# Patient Record
(demographics unavailable — no encounter records)

---

## 2024-12-19 NOTE — PHYSICAL EXAM
[General Appearance - Well Developed] : well developed [Normal Appearance] : normal appearance [Well Groomed] : well groomed [General Appearance - Well Nourished] : well nourished [No Deformities] : no deformities [General Appearance - In No Acute Distress] : no acute distress [Normal Conjunctiva] : the conjunctiva exhibited no abnormalities [Eyelids - No Xanthelasma] : the eyelids demonstrated no xanthelasmas [Normal Oral Mucosa] : normal oral mucosa [No Oral Pallor] : no oral pallor [No Oral Cyanosis] : no oral cyanosis [Normal Jugular Venous A Waves Present] : normal jugular venous A waves present [Normal Jugular Venous V Waves Present] : normal jugular venous V waves present [No Jugular Venous Oro A Waves] : no jugular venous oro A waves [Respiration, Rhythm And Depth] : normal respiratory rhythm and effort [Exaggerated Use Of Accessory Muscles For Inspiration] : no accessory muscle use [Auscultation Breath Sounds / Voice Sounds] : lungs were clear to auscultation bilaterally [Heart Rate And Rhythm] : heart rate and rhythm were normal [Heart Sounds] : normal S1 and S2 [Murmurs] : no murmurs present [Abdomen Soft] : soft [Abdomen Tenderness] : non-tender [Abdomen Mass (___ Cm)] : no abdominal mass palpated [Abnormal Walk] : normal gait [Gait - Sufficient For Exercise Testing] : the gait was sufficient for exercise testing [Nail Clubbing] : no clubbing of the fingernails [Cyanosis, Localized] : no localized cyanosis [Petechial Hemorrhages (___cm)] : no petechial hemorrhages [] : no ischemic changes [Skin Color & Pigmentation] : normal skin color and pigmentation [No Venous Stasis] : no venous stasis [No Skin Ulcers] : no skin ulcer [No Xanthoma] : no  xanthoma was observed [Oriented To Time, Place, And Person] : oriented to person, place, and time [Affect] : the affect was normal [Mood] : the mood was normal [No Anxiety] : not feeling anxious [FreeTextEntry1] : Mildly flat affect but less so today

## 2024-12-19 NOTE — REVIEW OF SYSTEMS
[Myalgia] : myalgia [Dizziness] : dizziness [Negative] : Heme/Lymph [Fever] : no fever [Headache] : no headache [Weight Gain (___ Lbs)] : no recent weight gain [Chills] : no chills [Weight Loss (___ Lbs)] : no recent weight loss [Dyspnea on exertion] : not dyspnea during exertion [Chest Discomfort] : no chest discomfort [Lower Ext Edema] : no extremity edema [Leg Claudication] : no intermittent leg claudication [Syncope] : no syncope [Abdominal Pain] : no abdominal pain [Nausea] : no nausea [Vomiting] : no vomiting [Heartburn] : no heartburn [Change in Appetite] : no change in appetite [Change In The Stool] : no change in stool [Diarrhea] : diarrhea [Constipation] : no constipation [Depression] : no depression [Anxiety] : no anxiety [Suicidal] : not suicidal [FreeTextEntry5] : No more syncope or near syncope. [FreeTextEntry9] : See HPI [de-identified] : See HPI [de-identified] : Less depressed less anxious

## 2024-12-19 NOTE — ASSESSMENT
[FreeTextEntry1] : Since his cardiac catheterization with stents to his LAD and obtuse marginal (8/2013), he has done very well. Since then the majority of his symptoms are totally gone, and he is left with his usual baseline mild dyspnea on exertion and atypical symptoms. Even these now seem to be less. He was bothered by BECK again. He had a good aerobic capacity on his stress test in 2015 and no evidence of ischemia. LV function was normal on his echo in July of 2016. No significant valvular disease. He had a repeat stress echocardiogram, which seemed to have EKG changes, although the echo was totally normal. After discussion we agreed to recheck his coronary arteries (4/30/2018) and angiography showed nonobstructive disease. He has done well since then. Had symptoms of muscle pain in his leg, similar to when he was on Lipitor and Crestor, resolved when he stopped the simvastatin, but he is now on pravastatin since August, 2018 without complaints. (He had a TIA on June 11, 2016 which was temporally related to aspirin being stopped by his urologist for a procedure and the patient not restarting the aspirin. He was placed on Plavix along with the resumption of aspirin and has had no further symptoms. Neurology wanted a repeat echo, which was normal, and no clots were seen. He was placed on telemetry and had both a 7 beat run of nonsustained VT at a heart rate of 147 and a 40 beat run of atrial flutter with aberrancy and a heart rate of 100-132. Both of these were asymptomatic. He was placed on bisoprolol and Xarelto. The rest of the home telemetry did not show any further V. tach or atrial flutter. He was doing fine other than watery diarrhea, as well as some fatigue that was probably a beta blocker side effects. I believe years ago he had trouble with beta blockers as well. I stopped the bisoprolol and changed to atenolol and both the fatigue and the diarrhea resolved. He developed anterior thigh pain on the atorvastatin, and I changed him to simvastatin, with improvement, but then I recently raised the dose) He had another TIA almost identical on July 19, 2021. Resolved very quickly although the following morning on the 20th in the hospital to me his speech seemed a little off still. Work-up totally negative as mentioned above. No large vessel disease anywhere. No atrial fibrillation. He is on Xarelto and Plavix and compliant with both. Labs were unremarkable. The only abnormality was a "significant territory with delayed mean transit time suggesting territory at risk for infarction". He is on maximum protection already with statin Plavix Xarelto etc. He was scheduled for an echo as requested by neurology which was unremarkable and unchanged, and I urged him to follow-up with neurology and see if they have any suggestions as how to prevent this in the future. He did follow-up with neurology, had a negative EEG, and the neurologist is happy with how he is doing and does not want to see him again until August unless new symptoms arise. On exam he is totally unchanged with a normal exam, normal blood pressure, benign abdomen with nodular consistency unchanged, and a baseline EKG that has not changed with sinus rhythm, small Q's inferiorly, which are old, first degree AV block, T wave inversion V1 (not V2 today), and the rest is within normal limits. No APCs or VPCs on exam just some VPCs during stress and recovery 3/2022. His blood pressure is excellent. His weight is slightly less. He is now on Invokana as well as Victoza for his diabetes. Here in follow-up without any new cardiac complaints or issues. Neurologically seems stable although still concerning that we do not have definite etiologies. Having muscle pains from pravastatin so we will try 1 more statin and if not we will try PCSK9 inhibitors. Labs sent including CK. Hemoglobin A1c will be checked as well. He already had a flu shot as well as his COVID boosters. Will be going down to [place] for a while. If stable will return in 4 months. Patient here January 26, 2023.. Very confusing picture but does not sound neurologic; no findings of TIA or CVA. Perhaps muscle inflammation, pain, weakness, perhaps related to the combination of statin and fibrate although has always had normal CK levels. In addition the statin was stopped a few weeks ago. Associated weight loss also hard to explain and tied together. Currently patient looks back to baseline other than the weight loss, no leg pains, cardiac exam and EKG unchanged. Labs were sent which will include sed rate, CRP, CK, aldolase, TSH etc. We will try to change to PCSK9 inhibitors; perhaps can stop the fenofibrate as well them. Unclear as yet if he should have neurologic follow-up as well. Reviewed medications; is on Eliquis not Xarelto now. Off statin but still on Zetia and fenofibrate. Off Jardiance but on Farxiga and Ozempic Returned March 14, 2023 on Repatha but still gets some anterior thigh pain. Still on fenofibrate and Zetia. From a cardiac point of view totally stable and if anything his weight is back up 4 pounds. He will be seeing neurology next week. EKG is totally unchanged in sinus rhythm. Returned again June 13, 2023. Has been very stable other than the one fall on the holiday coming from [place] and the episode of shortness of breath while putting a sheet on the bed with his wife this morning. Exam and ECG today are unchanged. Labs will be sent. Now on Abilify as well. Tolerating Repatha without a problem. Pretty good spirits. Weight up a few more pounds which may be a good sign. Urgent visit July 19 after continuing dizzy and near syncopal episodes which now have seem to resolve off atenolol totally. Still remains in sinus rhythm and still has a first-degree AV block. No other symptoms at this time. I reviewed his long medication list. In addition to being off atenolol he is on Vascepa which was given to him by Dr. Cohen (I am not in favor of Vascepa usually). No other medications that have any significant effect on blood pressure or heart rate. Exam is unchanged and unremarkable. Another urgent visit September 11, 2023. Having trouble with his legs, trouble with his walking and his balance with a few falls and seemingly weakness. To them it seems like when he was in [place] and after that we had stopped his statin. His endocrinologist had recently put him on Vascepa and I am concerned that that may be including the fenofibrate could be the issue. Now that he is on Repatha injections he may not need even the fenofibrate for his triglycerides so for now I told him to stop both the Cipro and fenofibrate while I check labs. He had no evidence of myasthenia gravis on exam. I would like him to see neurology again especially if there is no change off of those 2 medications. He does seem perfectly stable from a blood pressure and cardiac point of view with no syncope or near syncope since we stopped his atenolol. Patient returned October 10, 2023 and seems as close to normal as he has been in a while. 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 Cardiovascular status is stable, blood pressure good, EKG unchanged and exam unremarkable. We will continue to current medications and if he remains stable he can follow-up for routine visit plus labs in 3 months. Patient returned again December 20, 2023. He had another episode of neurologic symptoms with unclear etiology. The only difference this time seems to be a 50 to 60% stenosis in his common carotid arteries but surgical consult at the time felt this is still a nonsurgical case. He will be following up with vascular neurology. I reviewed the rest of his hospital studies and his medications. He seems to be back to himself at least 90%. Labs in the hospital were unremarkable. His LDL cannot go much lower, his blood pressure is excellent his diabetes control is excellent, he is on Eliquis and Plavix so it is not clear what else there is to offer and whether or not there are any other etiologies. As he is seeing Dr. Soriano in January and a different neurologist in February I would follow-up with him in March unless there are any more events. Patient returned February 12, 2024. Very happy with Dr. Soriano who felt also that there was no indication for revascularization or any intervention and just wants to follow him clinically now. Dr. Soriano wants to continue the Plavix and the Eliquis and may be repeat a carotid Doppler in a year and only see him again in 1 year. We reviewed the patient's medications including the fark [employer]. He is still on fenofibrate along with Ozempic, Repatha, ezetimibe. Metformin. He seems to be at baseline from a cardiac point of view as well with good blood pressure control, no real cardiac symptoms, no change in his ECG with sinus bradycardia at 59 and a first-degree AV block. Labs were sent. Patient returned June 17, 2024. New issue was a hospitalization for about 2-1/2 days at the end of April during [facility] for transient global amnesia. Workup as mentioned above. No change in his medications and he is clearly back to himself. Exam vital signs ECG all baseline. Reviewed all of his records send long discussion with patient. Patient returned for an urgent visit, September 13, 2024 because of another TIA episode. This time the drooping face was on the right instead of the left and the patient was initially not aware until he tried to speak. It had been 5 months since his last episode. It only lasted about 5 minutes and there was a question of whether he would seem to come back before he went into the ambulance but by the time he got to the ambulance he was back to normal and was only in the hospital briefly. We reviewed all his medications. He saw Dr. Hermilo Soriano of neurology who reviewed everything and as mentioned he is on correct and maximum everything without much to add other than increasing his bleeding risk. Perhaps he occasionally misses some medication but he is not sure. Meanwhile we will check his labs and make sure that everything is where it should be and just forge ahead.Not sure what Dr. Soriano meant about the Watchman device or how that would change anything. So far there has been no evidence that atrial arrhythmias have been playing a role with these episodes. Agree there is no indication for carotid intervention at this time.   Patient returns in follow-up today December 19, 2024.  Thankfully no more TIA episodes since above, very stable from a cardiovascular point of view, excellent blood pressure normal EKG etc.  Unfortunately having a lot of pain in his left thigh which Dr. Phillip thinks is from his spine but has not been able to help him so far including spinal injections and Neurontin.  I suggested maybe he actually see an orthopedist.  Otherwise I am happy with how he is doing and we tried to emphasize the positive.  Labs were sent.

## 2024-12-19 NOTE — DISCUSSION/SUMMARY
[EKG obtained to assist in diagnosis and management of assessed problem(s)] : EKG obtained to assist in diagnosis and management of assessed problem(s) [FreeTextEntry1] : Labs sent including lipids, hemoglobin A1c, proBNP.  If all okay will follow-up in 4 months.  No change in medicines from my point of view.

## 2024-12-19 NOTE — CARDIOLOGY SUMMARY
[No Ischemia] : no Ischemia [LVEF ___%] : LVEF [unfilled]% [Normal] : normal LA size [None] : no mitral regurgitation [___] : [unfilled] [de-identified] : Done at [facility] July 20, 2021.  No change from previous CT September 5, 2018.  No core infarct but there is 101 mm territory with delayed mean transit time (which supposedly suggest territory at risk for infarction).  Carotid bifurcations are normal.  Carotid and vertebrals are normal.  No aneurysm stenosis or vessel occlusion.  Sinuses are normal.  MRI was normal as well.

## 2024-12-19 NOTE — HISTORY OF PRESENT ILLNESS
[FreeTextEntry1] : In 2000 the patient developed classic exertional angina walking up hills or walking fast or walking in the cold. The pressure in his chest that radiated to his throat and the associated with shortness of breath. It was always relieved with rest. Stress tests were totally normal but the symptoms persisted. He finally underwent cardiac catheterization in September, 200 which just showed a 40-50% mid LAD lesion and otherwise normal coronary arteries with normal LV function. The thought was that he either had Syndrome X with angina despite normal coronary arteries or that this was related to esophageal reflux or asthma. Patient was diagnosed with asthma and has been treated by Dr. Edgar Meza of allergy. He did end up having an endoscopy that was totally normal In 2001. In addition he also has a metabolic syndrome X with low HDL, high triglycerides, and borderline glucose. He has done well over the years on medical therapy including Crestor, TriCor, Cardizem CD, metformin, and inhalers. He is also on omeprazole for presumed esophageal reflux despite normal endoscopy. He had erectile dysfunction from beta blockers. He developed hypotension at one point with some orthostasis and his Cardizem CD had to be stopped.  11/2012 His symptoms returned, mostly being chest tightness with exertion relieved with rest again. He saw Dr. Edgar Meza who increased his inhalers by adding Flonase and then the patient came here. He denies any change in his weight, bowel movements, or any other health situation. He had a stress echocardiogram in November 20 where he exercised to 9 METS, . He had his usual chest pain or shortness of breath. There were EKG changes but they returned to baseline within 2 minutes of recovery. On echocardiogram it was a little difficult to see the inferoseptum, but this seemed unchanged from the rest images. The rest of the echo was unremarkable. It was felt that this was his usual chest pain, shortness of breath that was nonischemic and he has actually done well since November. However just a couple weeks ago he started having the same symptoms again with just walking up stairs in his house or with any exercise. His asthma has been under good control although his reflux has been acting up a little bit lately. Otherwise he has been fine and is only into him medical issues was recently having a cystoscopy under anesthesia. He underwent stress echocardiogram here which was somewhat abnormal and different than his previous stress echoes. His ST depressions lasted longer than before, and there was definitely a wall motion abnormality anteriorly though it could not be certain if it was reversible.  He was scheduled for a CTA which showed significant coronary artery disease. 8/12/2013 he underwent cardiac catheterization with Dr. Eliud Arrington. He was found to have 90% proximal LAD, 80% mid LAD that were both stented and an 80% obtuse marginal that was stented with drug-eluting stents. The right coronary artery just has some plaquing no more than 30% lesion. He had normal LV function. This was done on the second and he went home on August 3 has done well since.   August 15, 2013, Here in followup and his recent symptoms are mostly gone but he still gets occasional mild shortness of breath with exertion; this symptom he had in the past even when his stress tests were normal and may not be cardiac related. His only new medications are lisinopril 2.5 as he was off his ACE inhibitor for a while and Plavix for stent. October 17, 2013 he continues to do well, going on the treadmill for half hour every day at varying inclines. He has the same symptoms mentioned on the last visit which are mostly his asthma-related symptoms. He feels a tiny bit of chest pain when he first starts walking on the treadmill but after that it's gone and he attributes that to his reflux. Normal activities off the treadmill are fine. He is being very good about his diet alone his wife is getting a little crazy trying to maintain want he can eat between the carbohydrates and fat restriction. February 20, 2014. Here for followup. He is doing extremely well, exercising on the treadmill without symptoms, not having any orthostatic symptoms. He had a shingles vaccine yesterday at the pharmacy. His labs were excellent and he was continued on the same medication. Vy 10, 2014. The patient called yesterday and mentioned that now when he goes on the treadmill again he is getting that same chest pain he had before. He wasn't sure that this was significant, but when he tried to go on again the next day he could only do 7 minutes and the chest pain recurred. He is here today for followup along with a stress echocardiogram to see if those symptoms are ischemic related. His allergies have been acting up somewhat and has taken an occasional Allegra and Allegra-D. He has not had any symptoms walking level ground or climbing stairs, only the 2 episodes while on the treadmill. Here he was able to exercise for 3 full stages to 9 minutes without any symptoms, significant EKG changes, or echocardiographic evidence of ischemia. He has put on some weight and he did have coffee prior to exercising at home.  His stress echo was normal and he was reassured. October 13, 2014. Patient is here for followup. He is more than one year since his coronary stent and is now off dual antiplatelet meds. He continues to gain some weight over the holidays but otherwise has no complaints. February 26, 2015. The patient is here for followup. No chest pain, dyspnea on exertion, or any exertional symptoms but he does feel that he is falling asleep in the middle of the day and is much more tired than he thinks he should be. He now has a treadmill at work and even use that on a regular basis. No interim hospitalizations. We had a long discussion about DAPT after the recent studies came out but so far he is only on aspirin. No interval hospitalizations or procedures. April 28, 2015. Patient here for stress test and echocardiogram along with a brief visit for a preop clearance. His echocardiogram showed normal LV function with no significant valvular disease. His stress test he was able to exercise 8 METs and a heart rate of 132 with a normal blood pressure response with shortness of breath but no chest pain and with no evidence of ischemia. His exam was unchanged and he should not be considered to be at increased risk for the upcoming urologic procedure.  He had no complications from the neurologic procedure. December 2015 patient elected to stop the Plavix and just continue baby aspirin. In March he was getting muscle pains from Crestor, and we changed to Lipitor, and he did well. In April. He was having some leg pains and stop the Lipitor but there was no change so he went back on the Lipitor. June 6, 2016. Patient is here for preop clearance for a cystoscopy and internal urethrotomy. No chest pain or shortness of breath, even going up 3 flights of stairs. No change in his medications. He had preop labs done at [facility]. CBC, unchanged. Chemistries acceptable, with a BUN of 13 and a creatinine of 0.92. Glucose was 180 with a hemoglobin A1c of 7. Urinalysis was unremarkable other than glucose. EKG was unchanged from prior tracings. July 20, 2016. Here for urgent followup. On June 11 patient had a TIA. Despite my notification to the urologist on the preop recommendations and my instructions to the patient, he was told to stop his aspirin 10 days before the procedure and he did not resume the aspirin after the procedure. In the emergency room everything resolved quickly although there was one question of a new symptom on the left arm whereas the original symptoms have been on the right. Based on those findings. Plavix was added to aspirin, but Dr. Eamon Zimmerman wanted home monitoring looking for atrial arrhythmias. The patient has been wearing the monitor and has been totally asymptomatic. On July 11th, at around midnight, the patient had a 7 beat run of ventricular tachycardia at a rate of 147 beats per minute. No other the PVCs or ventricular tachycardia since then. However, on July 17 at 6:15 in the morning the patient had a 40 beat run of atrial flutter with aberrancy at a heart rate between 100 and 132. Again, this was asymptomatic. He is here for followup and to discuss. There have been no change in his medications and no interval symptoms. Based on these findings, I elected to place him on a low-dose beta blocker, which hopefully he will tolerate this time, and Xarelto 20 mg for anticoagulation. Because the TIA was so recent and because it was temporally related to him stopping aspirin therapy, I would continue him on one antiplatelet agent for a short time as well. Given that the patient has significant acid reflux and heartburn, we will continue Plavix and stop the aspirin for now. Extensive discussion was had with the patient and his wife. He will continue the home telemetry through July 30. August 11, 2016. The patient had no further arrhythmias on home telemetry. He has had no recurrence of TIA symptoms. However, he is having fatigue, and other beta blocker side effects, and is having watery diarrhea whenever he goes to the bathroom. The two new medications are bisoprolol and Xarelto. Otherwise, he feels fine, but is somewhat discouraged by these side effects. Changed bisoprolol to atenolol with resolution of his symptoms. Then, was getting anterior thigh pain, and I changed his atorvastatin to simvastatin. December 19, 2016. Patient is here in followup. No muscle pains on simvastatin. Very happy with current regimen in terms of side effects and tolerability. No recurrence of TIA, syncope, palpitations, exertional chest pain, etc. Had flu shot already. TG was 1100 so reinstituted Tricor. Hemoglobin A1c was 7.4. March 2, 2017. Patient here in followup. No palpitations, dizziness, chest pain, shortness of breath, etc. Is back on Tricor without symptoms. Will be seeing Dr. Evaristo Cohen of endocrinology next week. Saw Dr. Storm of  on January 10 was pleased with his progress. BUN and creatinine came back elevated; possibly from the recent urologic procedures. He saw Dr. Evaristo Cohen of endocrine, and on repeat testing his creatinine was 1.24. He was also placed on Invokanna. April 6, 2070. Now here for followup with repeat lab testing et cetera. Feeling well without complaints. Tolerating the Invokanna although it is expensive. In August 2017. Patient here in followup. Doing well, and weight continues to stay down. No change in symptoms. He did call in June and then was having muscle aches again and went back to simvastatin with improvement. No other change in medications and no interval hospitalizations.  Based on the labs I increased his simvastatin to 80 mg. December 7, 2017. Patient in followup. He had some symptoms a few weeks ago, but clearly turned out to be related to his asthma and he has had not had a problem since. He feels well without complaints. He is tolerating the 80 milligram dose of simvastatin. He is also on fenofibrate because of his high triglycerides. Last stress test was 2015 with an echo last year. April 12, 2018. Here for f/u. Mentioned BECK but no chest pain. Also rare RLQ pain at times. April 27, 2018. Had patient returned for stress echocardiogram due to symptoms. Exercised to 6 minutes, but heart rate only 103. Did have shortness of breath and then admitted a little bit of chest pressure. EKG in recovery did have ST depressions although not severe in II, III, aVF, V3 through 6, which he did not have on prior stress test since his stent. Echo did not show any wall motion abnormalities. After discussion with patient elected to schedule him for coronary angiography May 1, 2018. LVEF 70%. Normal left main. 30% proximal circumflex. 50% first obtuse marginal. 40% proximal RCA. 60% distal RCA with negative FFR. Moderate atherosclerosis distal LAD. Stents patent  August 16, 2018. The patient is here in followup. No chest pain or shortness of breath. Again, has pain in his right thigh and knee without tenderness, similar to what he had before on other statins. After much discussion, we agreed to stop the simvastatin, and if the symptoms resolve rechallenge with it. If necessary to change we will first try Pravachol, and then Livalo. December 20, 2018. Patient here in followup. Feels great. No complaints. Blood pressure excellent. Weight stable. (Today is his [place] birthday--70.) On pravastatin, and seems to be tolerating it. April 16, 2019. Patient in followup doing well with no complaints or symptoms. No change in medication. Only negative is he has gained about 3 more pounds. August 14, 2019. Patient here in followup. Had labs with Dr. Cohen recently the results of which are pending. Remains with sinus rhythm and a first degree AV block. No symptoms. No complaints. Went on a trip [place] in July with the whole family and had no problems. Weight is actually down 3-1/2 pounds. December 16, 2019.  Labs from Dr. Danielle. Cholesterol 112, HDL 28, LDL 32, BUN 29 with creatinine 1.33 and potassium 4.9. Normal thyroids. Normal CBC. Hemoglobin A1c 7.2. Eosinophils 7.3%.  December 23, 2019.  Patient here in follow-up.  Weight down 1 pound.  No chest pain or shortness of breath.  EKG unchanged.  Only new complaint is his left arm is a little weaker than it used to be and he has a little difficulty with his elbow etc.  That was the arm affected a little bit by his TIA as well.  I advised to go back to the neurologist but he wants to see Nii Oneill of orthopedics first.  He is thinking of joining a gym with a friend of his now that he is living in [place]. April 23, 2020.  Patient here in follow-up.  EKG sinus bradycardia at 52 with tiny Q's in 3 and aVF, T wave inversions V1 V2, and otherwise within normal limits.  No change in his baseline shortness of breath or chest pain regarding his asthma.  No palpitations or dizziness to suspect any paroxysmal arrhythmias.  He did have a period of time few weeks ago with severe muscle cramps and aches but no fever no respiratory symptoms and he went yesterday for a fingerstick antibody test in an urgent care that was negative for coronavirus. August 28, 2020.  Patient here in follow-up.  Enjoying life in [place] near his son.  No chest pain shortness of breath or any medical issues.  Saw Dr. Cohen of endocrinology last week and asked me to forward the labs to him.  Discussion about triglycerides as usual.  EKG is sinus rhythm at 58 with small Q waves in III and aVF. December 11, 2020.  Patient here in follow-up.  Going to [place] for a month this coming Sunday.  Saw Dr. Atkins on the 10th and labs showed a normal CBC, BUN 27 with creatinine 1.4 and potassium 4.5.  Normal LFTs.  Cholesterol 124 fasting triglycerides 361 HDL 29 LDL 23.  Uric acid 5.1.  Hemoglobin A1c 7.2.  Patient denies any cardiac issues or complaints.  EKG here unchanged. March 2, 2021.Patient has been having some dizziness mostly orthostatic when he gets up from sitting too long.  His internist said it could be orthostatic hypotension.  Patient called to ask about stopping his lisinopril.  He is only on 2.5 mg daily.  I told him he could certainly do that for now and then reevaluate.  April 7, 2021.  Patient here in follow-up.  EKG shows sinus rhythm at 68 with a first-degree AV block, tiny Q's inferiorly, mild ST changes V2; unchanged from last December. No dizziness since d/c lisinopril. Continues to loss weight gradually. Fully vaccinated against Covid 19. Much more relaxed since retired. July 20, 2021.  Yesterday EMS called at the patient's house because he had some weakness and slurred speech that was already mostly resolving.  I instructed them to take him to [facility].  Their code stroke was called but there was no indication for TPA.  Virtually all his symptoms had resolved.  The CT angio showed no large vessel disease and no evidence of infarction.  There was 1 area of significant territory with "delayed mean transit time suggesting territory at risk for infarction" but no idea what to do about that.  MRI was done later that night that was negative as well.  Labs were unremarkable.  Sinus rhythm throughout.  He was discharged on the 20th to follow-up with me and with neurology.  Echo was recommended as part of the protocol but he has had normal echoes in the past, not likely to have an LV thrombus, MERON thrombus would not be seen without PIERRE and the patient has been on Xarelto for out so very unlikely. July 22, 2021.  Patient returns here in follow-up.  Feels 100% back to himself although a little hesitant and anxious.  He remains in sinus rhythm with no change.  Blood pressure is excellent.  He will be scheduled for an echo and a follow-up with neurology.  He will remain on all his medications which include Plavix and Xarelto as well as statin. August 2, 2021.  The neurologist from [facility] was out of town so her friend recommended Dr. James of neurology who saw the patient and called me today.  For lack of anything else to do he suggested trying to switch from Xarelto to Eliquis 5 every 12 hours which I have no objection.  Discussed with the patient as well and will start the Eliquis tonight instead of Xarelto.  He has an appointment for an echocardiogram here August 13 and so far has not had any further episodes.  August 13, 2021.Came for echocardiogram.  Minimal MR.  Normal aortic valve.  Normal left atrium.  Normal LV function with LVEF 67%.  Normal RV function.  RVSP 30.  No color-flow Doppler evidence of ASD or PFO.  Spoke with patient who sounds back to normal.  Awaiting the reports from his neuro work-up.  August 20, 2021.  Received a note from the neurologist Dr. Crocker ([phone]) basically he is not convinced that these are TIAs and somehow thinks they could be related to cervical radiculopathy.  He is convinced that everything is being done to the maximum in terms of risk factors between the Eliquis the level of his LDL and his diabetes control etc.  He is considering reviewing the transcranial and intracranial Dopplers and considering an EEG to rule out seizures which I think is very unlikely.  He has the patient following up with orthopedics for cervical radiculopathy and he gave him a prescription for physical therapy and Occupational Therapy and advised him to take co-Q10 for the memory changes and headaches.  He will follow up with the patient in 4 to 6 weeks.  October 7, 2021.Patient called.  Had EEG in the neurologist office but is still waiting for results.  Nothing in the computer.  He will call them and asked them to send results to me as well  October 27, 2021.  Patient here in follow-up.  The EEG was negative.  So far he has had no recurrence. Feels well without complaints although does seem to have a slightly depressed affect at times. February 11, 2022.Patient called because he had a nuclear brain scan by neurology.  The scan is totally normal and it says "does not support the diagnosis of Parkinson's disease or Parkinson syndrome"  March 8, 2022.  Patient here for follow-up along with stress echocardiogram.  Definite difficulty walking and could really not go faster than stage one of the Vu protocol.  At that point his heart rate was 92 and his blood pressure was about 140/70 and he had no chest pain, no ST changes, and no echo evidence of ischemia.  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  Seems a little bradykinetic and parkinsonian affect but no complaints.  Neurology has been satisfied with how he is doing. July 12, 2022.  Patient here in follow-up.  Absolutely no more episodes since above.  Stable weight, no new symptoms, no change in medication.  No COVID episodes etc.  (Going to [employer] now with his wife) November 15, 2022.  Patient returns in follow-up.  Saw neurology who is very happy with how he is doing.  He seems to have only 1 small defect in terms of writing certain types of letters but otherwise stable and they do not want to change anything in his regimen.  (They were supposed to send me a letter but I have not received it yet).  He has no new issues with chest pain or shortness of breath and no COVID issues.  His psychiatrist Dr. Colon is retiring and he wants me to continue prescribing his Lexapro 10 mg daily which he will do for now.  Getting muscle pains from the pravastatin and he finally stopped them.  Has already been on atorvastatin and simvastatin so we will try 20 mg of rosuvastatin and check a CK.  I did reassure him that he does not so clear that it is a statins that cause it and he is willing to take PCSK9 inhibitors by injection if necessary as he is already doing well on Ozempic injections. January 23, 2023. Patient called from [place].  Certainly has urinary incontinence and weakness in his wife that he cannot walk.  Certainly have to be concerned about some type of cord compression among other things.  I urged the patient to go to an urgent care center and they can call me from there.  January 26, 2023.  Patient back now here with his wife to review the situation.  It seems around Sunday the 22nd patient's legs were so weak that he could not really walk.  No other signs or symptoms to suggest stroke or TIA.  No discomfort or pain.  Slight improvement on Monday so they went to urgent care.  While he did not urgent care was told him he should go to the emergency room and they decided instead to come back home.  He has continued to gradually improve and get some strength back.  In retrospect perhaps it is tied into the fact that he was having severe pain In his statin and the rosuvastatin was stopped at least 2 weeks ago may be more.  He still remains on fenofibrate and Zetia.  His last CK was normal.  He has been losing weight because of the change in appetite.  He mostly eats cakes and only occasional regular food.  Denies change in his bowel movements etc. March 14, 2023.  Patient has been totally stable since the episode in [place].  He is now on Repatha, still on fenofibrate and Zetia, still has some muscle pain in the front of his thighs but as mentioned his CK was normal.  In the past his triglycerides have gone as high as 700 so I am a little nervous to stop the fenofibrate and he has no complaints right now.  He will be seeing neurology next week and they have said they will be doing a number of tests as well.  No cardiac symptoms or issues.  His EKG remains sinus rhythm and unchanged.  He put back to about 4 pounds.  Blood pressure is excellent and his spirits are good. June 13, 2023.  Patient returns in follow-up.  Has been fine in the interim; Abilify 5 mg added by Dr. Colon.  Meanwhile today while just putting a sheet on the bed he suddenly felt short of breath and it lasted about 15 minutes. (His wife said it is an activity that he never does).  No chest pain, and then it went away like his asthma.  In addition on Shavuot he fell coming home from [place] and Dr. Sharma his PCP happened to be there and checked him out that day and the next day and all was fine.  They now have Wendy in his house so he does not have to worry about going to and from she will.  Remains in sinus rhythm at 64 with a first-degree AV block and small Q waves inferiorly and tall R in V1. June 28, 2023. Patient called. Was at the neurologist yesterday. Complained of some dizziness and he was told to drink a lot of water and check with me about his blood pressure being low. Patient is on atenolol 12.5 mg only. No longer on lisinopril. I told the patient just to make sure he drinks a lot of water and not to be concerned for now. The neurologist is setting him up for some type of scan coming up relatively soon. He also put him on Aricept 5 mg.  July 19, 2023.  Urgent visit for patient.  He called me last week while I was on vacation in [facility] and was still having near syncopal episodes.  I stopped the atenolol and he has been asymptomatic since.  His heart rate here is 68 and he remains in sinus rhythm with a first-degree AV block and some nonspecific ST changes with small Q's inferiorly.  His blood pressure is 134/72.  His weight is up as he admits to snacking more. September 11, 2023.  Here with his wife for urgent visit.  Progressive problems with weakness in his legs and or balance with not infrequent falls and now hesitating to even go out because of that.  At one point the wife said she thinks it similar to when he was in [place]; after that we had stopped his statin and changed to Repatha injections and he did seem to get better.  Lately Dr. Cohen of endocrinology added Vascepa and he is on fenofibrate for his high triglycerides in the past as high as 700. Reviewed labs with the patient. LDL extremely low. No evidence of myositis muscle enzymes or inflammation. Patient already feeling better off the Vascepa and fenofibrate. Is awaiting a callback from Dr. Florian Sandoval of neurology for an appointment. October 10, 2023.  Patient here in follow-up.  On September 12 he saw neurology, Dr. Crocker.  He did find a mildly unsteady gait but able to tandem, no limitations and the rest of his neuro exam seems normal.  He does have a sensory peripheral polyneuropathy consistent with diabetes as well as bilateral multilevel lumbosacral radiculopathy most pronounced at L4-L5.  He felt the dizziness was probably related to cerebellar stroke or else blood pressure issues and wants the patient to maintain excellent hydration.  Continue Eliquis and Repatha along with his Plavix.  (Plavix for cardiac reasons).  Try Namenda 5 mg as the patient had headaches from Aricept and consider neurocognitive specialist although the patient wanted to hold off.  Here today he is in sinus rhythm at 63 with a first-degree AV block and the rest of the ECG is unremarkable.  Blood pressure acceptable and weight unchanged.  Patient thinks his walking is much better and he thinks it was within a few days of stopping the Vascepa fenofibrate etc..  In good spirits.  (Will start using Dr. Mt Cohen's daughter-in-law for endocrinology now that Dr. Mt Danielle has passed away.) December 20, 2023.  Patient had another set of symptoms similar to the previous ones, again admitted to the hospital, again nothing specific found was then sent to rehab and is now here after finally getting out of rehab.  Remains in sinus rhythm at 65 with a mild first-degree AV block and small inferior Q waves.  Blood pressure acceptable at 132/62.  Weight unchanged.  In the hospital he had an EEG that was unremarkable for seizure activity just some generalized slowing in the background.  This time on CTA there is a 50 to 60% stenosis prior to the bifurcation in the distal right common carotid, 50% stenosis of the bifurcation of the left common and internal carotid.  He does feel much better with physical and Occupational Therapy and his speech seems close to normal again.  His regular neurologist recommended he see Dr. Soriano of vascular neurology In January and his internist Dr. Sharma recommended. a different neurologist for February visit.  December 29, 2023.Patient called because he has been having some orthostatic type symptoms. Its mostly after sitting for a long time and then he gets up and feels a little lightheaded. It is not very frequent and he does not get near syncopal etc. Certainly has not fallen from that. I reviewed his medications and he is on nothing that affects his blood pressure anymore. I explained to him about using things such as midodrine to raise his blood pressure but I would only use that if his symptoms were more consistent or more severe as otherwise he will be left with supine hypertension which in his condition would probably not be a good thing. He was okay with that reassurance and he will keep track of how often and how severe the symptoms are. January 2, 2024. Still having some orthostatic episodes, usually after sitting and watching TV for a long time, lasts maybe even up to 5 minutes. Discussed the pros and cons of medicines to raise his blood pressure which I would like to avoid if not necessary. For now we will try to make sure he maintains hydration and will add salt to his diet. Will make sure to be very aware of the possibility when he first changes positions and to wait before walking etc. If it does continue to happen despite this we will have to bring him in for midodrine or Florinef etc. January 31, 2024 Patient called after seeing Dr. Hermilo Soriano of neurology and I reviewed Dr. Soriano's note. No indication for carotid endarterectomy at this point in time. Continue current medications including Eliquis and Plavix. Will probably repeat carotid Dopplers at the 1 year duane. Patient very happy and currently has been asymptomatic. I did mention that he should not be alarmed or disappointed if the symptoms recur as we really have not found any etiology and thankfully he bounces back every time. Keep regular upcoming cardiac care. February 12, 2024.  Patient here in follow-up.  EKG is sinus bradycardia at 59 with a first-degree AV block 0.228 and otherwise an unremarkable ECG.  Initial blood pressure a little high but upon repeat it did improve.  This is somewhat of a long stretch without any symptoms and he is very happy and optimistic and encouraged that hopefully things will be stable on this current regimen and he is very happy with Dr. Soriano. June 5, 2024. Received labs dated May 30, 2024. Normal CBC. Normal chemistries except BUN 25 with creatinine 1.1. Potassium 4.3. Hemoglobin A1c 5.7 lipase 203 amylase 78. No lipids.  June 17, 2024.  Patient here in follow-up.  After the first Seder night, the next afternoon he awoke from a nap and had no idea who he was, thought his wife was his mother and he was taken to [facility].  Workup there including CT, MRI, labs all unremarkable. and he was discharged the next day.  After about a day and a half he was back to himself.  He was diagnosed with transient global amnesia and told he does not need any follow-up.  CT angio showed no carotid occlusion, no aneurysms, no stenosis or occlusion of anterior middle or posterior cerebral arteries or distal vertebral or basilar arteries.  There was moderate stenosis of the supraclinoid ICAs right more than left.  Less than 50% stenosis at the the origin of each ICA from a calcified bifurcation plaque.  His MRI had no acute change and it did have prominent ventricles disproportionate to the sulci but that was unchanged as well.  For some reason they did a cine esophagram and swallow test and there was trace laryngeal penetration without aspiration.  Here he is totally back to himself with normal vital signs and a normal ECG.  He had labs dated May 30 as mentioned above but no lipids. September13, 2024.  I spoke with Dr. Dane Sharma.  6 days ago on [place] he had another TIA with definitely trouble speaking and definitely trouble moving 1 side.  Dr. Sharma was there and evaluated the patient and within 5 minutes he was back to baseline.  He did end up seeing Dr. Hermilo Soriano of neurology On September 10.  He reviewed previous studies etc. and said he still sees no role for intervening in his carotids and he wanted to continue Plavix and Eliquis.  He raised the possibility of the recurrent TIAs or atrial fibrillation related and should start to consider a Watchman device (?).  He felt he might be a good candidate for the LAAOS for study.  Meanwhile he just wanted another carotid Doppler in a year with a follow-up appointment after that December 19, 2024.  Patient returns in follow-up.  He has not had any more episodes since the 1 back in September. Dr. Phillip "not worried about TIAs". Main problem is left thigh pain which he thinks is from the back but no response so far. Somewhat disheartened but okay overall.

## 2024-12-19 NOTE — REASON FOR VISIT
[FreeTextEntry1] : Followup of patient after instituting beta blocker, and Xarelto for paroxysmal A. fib, and paroxysmal ventricular tachycardia, after TIA off ASA, now another TIA while on Plavix and Xarelto. Here today for follow up visit after yet another admission with neurologic symptoms followed by rehab stay.  Now here after admission for transient global amnesia Now here after another TIA

## 2025-01-07 NOTE — PHYSICAL EXAM
[General Appearance - Alert] : alert [General Appearance - Well Nourished] : well nourished [General Appearance - Well Developed] : well developed [Oriented To Time, Place, And Person] : oriented to person, place, and time [Affect] : the affect was normal [Mood] : the mood was normal [Person] : oriented to person [Place] : oriented to place [Time] : oriented to time [Concentration Intact] : normal concentrating ability [Visual Intact] : visual attention was ~T not ~L decreased [Naming Objects] : no difficulty naming common objects [Repeating Phrases] : no difficulty repeating a phrase [Writing A Sentence] : no difficulty writing a sentence [Fluency] : fluency intact [Comprehension] : comprehension intact [Reading] : reading intact [Past History] : adequate knowledge of personal past history [Cranial Nerves Optic (II)] : visual acuity intact bilaterally,  visual fields full to confrontation, pupils equal round and reactive to light [Cranial Nerves Oculomotor (III)] : extraocular motion intact [Cranial Nerves Trigeminal (V)] : facial sensation intact symmetrically [Cranial Nerves Facial (VII)] : face symmetrical [Cranial Nerves Vestibulocochlear (VIII)] : hearing was intact bilaterally [Cranial Nerves Glossopharyngeal (IX)] : tongue and palate midline [Cranial Nerves Accessory (XI - Cranial And Spinal)] : head turning and shoulder shrug symmetric [Cranial Nerves Hypoglossal (XII)] : there was no tongue deviation with protrusion [Motor Tone] : muscle tone was normal in all four extremities [Motor Strength] : muscle strength was normal in all four extremities [No Muscle Atrophy] : normal bulk in all four extremities [Sensation Tactile Decrease] : light touch was intact [Balance] : balance was intact [Full Visual Field] : full visual field [Hearing Threshold Finger Rub Not Sweet [place]] : hearing was normal [] : no respiratory distress [Heart Rate And Rhythm] : heart rate was normal and rhythm regular [Abnormal Walk] : normal gait

## 2025-01-07 NOTE — HISTORY OF PRESENT ILLNESS
[FreeTextEntry1] : Mr. Kuldeep Laird is a 74 y/o man with PMH of DM, HLD, CAD (status post stents), atrial fibrillation (on Eliquis), BPH, TIAs and strokes including (per outpatient neurologist) right pontine stroke and cerebellar strokes with some residual left-sided weakness. He presented to [facility] ED 11/15/23 with fluctuating left facial and left hemiparesis. CT HEAD was negative, CTA: Right carotid system: moderate calcified plaque at the distal common carotid artery immediately prior to the bifurcation resulting in a moderate to high-grade (50-60 %) stenosis. Left carotid system: Extensive calcified plaque at the distal LEFT common carotid artery and proximal LEFT internal carotid artery resulting in a moderate (50%) stenosis at the bifurcation. MRI BRAIN: No evidence of acute infarction or midline shift. MRI BRAIN: No evidence of acute infarction or midline shift. EEG: Mild generalized cerebral dysfunction, nonspecific. No epileptiform activity or seizures were recorded. He was evaluated by neurosurgery, but deemed not a candidate for carotid revascularization. He was discharged to [facility] on 11/20/23. He is on Plavix and Eliquis. He comes in today for a follow up visit. He was evaluated at [facility] ED on 09/07/2024 for a transient right facial droop and dysarthria, lasting 10-15 min. CT Head was negative and CTA showed unchanged moderate bilateral carotid stenosis. P2Y12 was 98 and he denies missing any doses of Eliquis. On 09/23/24 he presented to [facility] ED with speech difficulties. MRI HEAD was negative, Carotid doppler shows mild - moderate stenosis and he was discharged the next day. He comes in today for a follow up visit. He denies any interval episodes since the September event. He is on Eliquis and Plavix for stroke prevention.

## 2025-01-07 NOTE — REVIEW OF SYSTEMS
[Feeling Tired] : not feeling tired [Suicidal] : not suicidal [Depression] : no depression [Confused or Disoriented] : no confusion [Memory Lapses or Loss] : no memory loss [Decr. Concentrating Ability] : no decrease in concentrating ability [Difficulty with Language] : no ~M difficulty with language [Changed Thought Patterns] : no change in thought patterns [Repeating Questions] : no repeated questioning about recent events [Arm Weakness] : no arm weakness [Hand Weakness] : no hand weakness [Leg Weakness] : no leg weakness [Poor Coordination] : good coordination [Difficulty Writing] : no difficulty writing [Difficulties in Speech] : no speech difficulties [Numbness] : no numbness [Tingling] : no tingling [Fainting] : no fainting [Vertigo] : no vertigo [Tension Headache] : no tension-type headache [Difficulty Walking] : no difficulty walking [Eyesight Problems] : no eyesight problems [Loss Of Hearing] : no hearing loss [Palpitations] : no palpitations [Shortness Of Breath] : no shortness of breath [Vomiting] : no vomiting [Easy Bleeding] : no tendency for easy bleeding [Easy Bruising] : no tendency for easy bruising

## 2025-01-07 NOTE — DISCUSSION/SUMMARY
[FreeTextEntry1] : Mr. Kuldeep Laird is a 74 y/o man with recurrent TIAs, PMH of DM, HLD, CAD (status post stents), atrial fibrillation (on Eliquis), BPH, and past stroke including (per outpatient neurologist) right pontine stroke and cerebellar strokes with some residual left-sided weakness. He is s/p hospital admission at [facility] for fluctuating neurological symptoms, left sided weakness and falls. MRI HEAD was negative, and CTA showed moderate bilateral carotid stenosis. He is s/p hospital admission for transient right facial droop and dysarthria, likely a recurrent TIA. CTA shows that the bilateral carotid stenosis is moderate and unchanged from the previous study. As such, I still see no role for intervening for the carotid stenoses. He is 3+ months s/p episode of speech difficulty, all work up was negative, etiology likely complex migraine. He will continue the current medication regimen, Plavix and Eliquis. Plan for follow-up carotid doppler in 9 months and follow up appt after. All of their questions and concerns were addressed.

## 2025-04-10 NOTE — REVIEW OF SYSTEMS
[Myalgia] : myalgia [Dizziness] : dizziness [Negative] : Heme/Lymph [Weight Loss (___ Lbs)] : [unfilled] ~Ulb weight loss [Fever] : no fever [Headache] : no headache [Weight Gain (___ Lbs)] : no recent weight gain [Chills] : no chills [Dyspnea on exertion] : not dyspnea during exertion [Chest Discomfort] : no chest discomfort [Lower Ext Edema] : no extremity edema [Leg Claudication] : no intermittent leg claudication [Syncope] : no syncope [Abdominal Pain] : no abdominal pain [Nausea] : no nausea [Vomiting] : no vomiting [Heartburn] : no heartburn [Change in Appetite] : no change in appetite [Change In The Stool] : no change in stool [Diarrhea] : diarrhea [Constipation] : no constipation [Depression] : no depression [Anxiety] : no anxiety [Suicidal] : not suicidal [FreeTextEntry5] : No more syncope or near syncope. [FreeTextEntry9] : See HPI [de-identified] : See HPI [de-identified] : Less depressed less anxious

## 2025-04-10 NOTE — DISCUSSION/SUMMARY
[EKG obtained to assist in diagnosis and management of assessed problem(s)] : EKG obtained to assist in diagnosis and management of assessed problem(s) [FreeTextEntry1] : Patient doing well.  Labs sent as mentioned above.  If all okay just clinical follow-up in 4 months.

## 2025-04-10 NOTE — HISTORY OF PRESENT ILLNESS
[FreeTextEntry1] : In 2000 the patient developed classic exertional angina walking up hills or walking fast or walking in the cold. The pressure in his chest that radiated to his throat and the associated with shortness of breath. It was always relieved with rest. Stress tests were totally normal but the symptoms persisted. He finally underwent cardiac catheterization in September, 200 which just showed a 40-50% mid LAD lesion and otherwise normal coronary arteries with normal LV function. The thought was that he either had Syndrome X with angina despite normal coronary arteries or that this was related to esophageal reflux or asthma. Patient was diagnosed with asthma and has been treated by Dr. Edgar Meza of allergy. He did end up having an endoscopy that was totally normal In 2001. In addition he also has a metabolic syndrome X with low HDL, high triglycerides, and borderline glucose. He has done well over the years on medical therapy including Crestor, TriCor, Cardizem CD, metformin, and inhalers. He is also on omeprazole for presumed esophageal reflux despite normal endoscopy. He had erectile dysfunction from beta blockers. He developed hypotension at one point with some orthostasis and his Cardizem CD had to be stopped.  11/2012 His symptoms returned, mostly being chest tightness with exertion relieved with rest again. He saw Dr. Edgar Meza who increased his inhalers by adding Flonase and then the patient came here. He denies any change in his weight, bowel movements, or any other health situation. He had a stress echocardiogram in November 20 where he exercised to 9 METS, . He had his usual chest pain or shortness of breath. There were EKG changes but they returned to baseline within 2 minutes of recovery. On echocardiogram it was a little difficult to see the inferoseptum, but this seemed unchanged from the rest images. The rest of the echo was unremarkable. It was felt that this was his usual chest pain, shortness of breath that was nonischemic and he has actually done well since November. However just a couple weeks ago he started having the same symptoms again with just walking up stairs in his house or with any exercise. His asthma has been under good control although his reflux has been acting up a little bit lately. Otherwise he has been fine and is only into him medical issues was recently having a cystoscopy under anesthesia. He underwent stress echocardiogram here which was somewhat abnormal and different than his previous stress echoes. His ST depressions lasted longer than before, and there was definitely a wall motion abnormality anteriorly though it could not be certain if it was reversible.  He was scheduled for a CTA which showed significant coronary artery disease. 8/12/2013 he underwent cardiac catheterization with Dr. Eliud Arrington. He was found to have 90% proximal LAD, 80% mid LAD that were both stented and an 80% obtuse marginal that was stented with drug-eluting stents. The right coronary artery just has some plaquing no more than 30% lesion. He had normal LV function. This was done on the second and he went home on August 3 has done well since.   August 15, 2013, Here in followup and his recent symptoms are mostly gone but he still gets occasional mild shortness of breath with exertion; this symptom he had in the past even when his stress tests were normal and may not be cardiac related. His only new medications are lisinopril 2.5 as he was off his ACE inhibitor for a while and Plavix for stent. October 17, 2013 he continues to do well, going on the treadmill for half hour every day at varying inclines. He has the same symptoms mentioned on the last visit which are mostly his asthma-related symptoms. He feels a tiny bit of chest pain when he first starts walking on the treadmill but after that it's gone and he attributes that to his reflux. Normal activities off the treadmill are fine. He is being very good about his diet alone his wife is getting a little crazy trying to maintain want he can eat between the carbohydrates and fat restriction. February 20, 2014. Here for followup. He is doing extremely well, exercising on the treadmill without symptoms, not having any orthostatic symptoms. He had a shingles vaccine yesterday at the pharmacy. His labs were excellent and he was continued on the same medication. Vy 10, 2014. The patient called yesterday and mentioned that now when he goes on the treadmill again he is getting that same chest pain he had before. He wasn't sure that this was significant, but when he tried to go on again the next day he could only do 7 minutes and the chest pain recurred. He is here today for followup along with a stress echocardiogram to see if those symptoms are ischemic related. His allergies have been acting up somewhat and has taken an occasional Allegra and Allegra-D. He has not had any symptoms walking level ground or climbing stairs, only the 2 episodes while on the treadmill. Here he was able to exercise for 3 full stages to 9 minutes without any symptoms, significant EKG changes, or echocardiographic evidence of ischemia. He has put on some weight and he did have coffee prior to exercising at home.  His stress echo was normal and he was reassured. October 13, 2014. Patient is here for followup. He is more than one year since his coronary stent and is now off dual antiplatelet meds. He continues to gain some weight over the holidays but otherwise has no complaints. February 26, 2015. The patient is here for followup. No chest pain, dyspnea on exertion, or any exertional symptoms but he does feel that he is falling asleep in the middle of the day and is much more tired than he thinks he should be. He now has a treadmill at work and even use that on a regular basis. No interim hospitalizations. We had a long discussion about DAPT after the recent studies came out but so far he is only on aspirin. No interval hospitalizations or procedures. April 28, 2015. Patient here for stress test and echocardiogram along with a brief visit for a preop clearance. His echocardiogram showed normal LV function with no significant valvular disease. His stress test he was able to exercise 8 METs and a heart rate of 132 with a normal blood pressure response with shortness of breath but no chest pain and with no evidence of ischemia. His exam was unchanged and he should not be considered to be at increased risk for the upcoming urologic procedure.  He had no complications from the neurologic procedure. December 2015 patient elected to stop the Plavix and just continue baby aspirin. In March he was getting muscle pains from Crestor, and we changed to Lipitor, and he did well. In April. He was having some leg pains and stop the Lipitor but there was no change so he went back on the Lipitor. June 6, 2016. Patient is here for preop clearance for a cystoscopy and internal urethrotomy. No chest pain or shortness of breath, even going up 3 flights of stairs. No change in his medications. He had preop labs done at [facility]. CBC, unchanged. Chemistries acceptable, with a BUN of 13 and a creatinine of 0.92. Glucose was 180 with a hemoglobin A1c of 7. Urinalysis was unremarkable other than glucose. EKG was unchanged from prior tracings. July 20, 2016. Here for urgent followup. On June 11 patient had a TIA. Despite my notification to the urologist on the preop recommendations and my instructions to the patient, he was told to stop his aspirin 10 days before the procedure and he did not resume the aspirin after the procedure. In the emergency room everything resolved quickly although there was one question of a new symptom on the left arm whereas the original symptoms have been on the right. Based on those findings. Plavix was added to aspirin, but Dr. Eamon Zimmerman wanted home monitoring looking for atrial arrhythmias. The patient has been wearing the monitor and has been totally asymptomatic. On July 11th, at around midnight, the patient had a 7 beat run of ventricular tachycardia at a rate of 147 beats per minute. No other the PVCs or ventricular tachycardia since then. However, on July 17 at 6:15 in the morning the patient had a 40 beat run of atrial flutter with aberrancy at a heart rate between 100 and 132. Again, this was asymptomatic. He is here for followup and to discuss. There have been no change in his medications and no interval symptoms. Based on these findings, I elected to place him on a low-dose beta blocker, which hopefully he will tolerate this time, and Xarelto 20 mg for anticoagulation. Because the TIA was so recent and because it was temporally related to him stopping aspirin therapy, I would continue him on one antiplatelet agent for a short time as well. Given that the patient has significant acid reflux and heartburn, we will continue Plavix and stop the aspirin for now. Extensive discussion was had with the patient and his wife. He will continue the home telemetry through July 30. August 11, 2016. The patient had no further arrhythmias on home telemetry. He has had no recurrence of TIA symptoms. However, he is having fatigue, and other beta blocker side effects, and is having watery diarrhea whenever he goes to the bathroom. The two new medications are bisoprolol and Xarelto. Otherwise, he feels fine, but is somewhat discouraged by these side effects. Changed bisoprolol to atenolol with resolution of his symptoms. Then, was getting anterior thigh pain, and I changed his atorvastatin to simvastatin. December 19, 2016. Patient is here in followup. No muscle pains on simvastatin. Very happy with current regimen in terms of side effects and tolerability. No recurrence of TIA, syncope, palpitations, exertional chest pain, etc. Had flu shot already. TG was 1100 so reinstituted Tricor. Hemoglobin A1c was 7.4. March 2, 2017. Patient here in followup. No palpitations, dizziness, chest pain, shortness of breath, etc. Is back on Tricor without symptoms. Will be seeing Dr. Evaristo Cohen of endocrinology next week. Saw Dr. Storm of  on January 10 was pleased with his progress. BUN and creatinine came back elevated; possibly from the recent urologic procedures. He saw Dr. Evaristo Cohen of endocrine, and on repeat testing his creatinine was 1.24. He was also placed on Invokanna. April 6, 2070. Now here for followup with repeat lab testing et cetera. Feeling well without complaints. Tolerating the Invokanna although it is expensive. In August 2017. Patient here in followup. Doing well, and weight continues to stay down. No change in symptoms. He did call in June and then was having muscle aches again and went back to simvastatin with improvement. No other change in medications and no interval hospitalizations.  Based on the labs I increased his simvastatin to 80 mg. December 7, 2017. Patient in followup. He had some symptoms a few weeks ago, but clearly turned out to be related to his asthma and he has had not had a problem since. He feels well without complaints. He is tolerating the 80 milligram dose of simvastatin. He is also on fenofibrate because of his high triglycerides. Last stress test was 2015 with an echo last year. April 12, 2018. Here for f/u. Mentioned BECK but no chest pain. Also rare RLQ pain at times. April 27, 2018. Had patient returned for stress echocardiogram due to symptoms. Exercised to 6 minutes, but heart rate only 103. Did have shortness of breath and then admitted a little bit of chest pressure. EKG in recovery did have ST depressions although not severe in II, III, aVF, V3 through 6, which he did not have on prior stress test since his stent. Echo did not show any wall motion abnormalities. After discussion with patient elected to schedule him for coronary angiography May 1, 2018. LVEF 70%. Normal left main. 30% proximal circumflex. 50% first obtuse marginal. 40% proximal RCA. 60% distal RCA with negative FFR. Moderate atherosclerosis distal LAD. Stents patent  August 16, 2018. The patient is here in followup. No chest pain or shortness of breath. Again, has pain in his right thigh and knee without tenderness, similar to what he had before on other statins. After much discussion, we agreed to stop the simvastatin, and if the symptoms resolve rechallenge with it. If necessary to change we will first try Pravachol, and then Livalo. December 20, 2018. Patient here in followup. Feels great. No complaints. Blood pressure excellent. Weight stable. (Today is his [place] birthday--70.) On pravastatin, and seems to be tolerating it. April 16, 2019. Patient in followup doing well with no complaints or symptoms. No change in medication. Only negative is he has gained about 3 more pounds. August 14, 2019. Patient here in followup. Had labs with Dr. Cohen recently the results of which are pending. Remains with sinus rhythm and a first degree AV block. No symptoms. No complaints. Went on a trip [place] in July with the whole family and had no problems. Weight is actually down 3-1/2 pounds. December 16, 2019.  Labs from Dr. Danielle. Cholesterol 112, HDL 28, LDL 32, BUN 29 with creatinine 1.33 and potassium 4.9. Normal thyroids. Normal CBC. Hemoglobin A1c 7.2. Eosinophils 7.3%.  December 23, 2019.  Patient here in follow-up.  Weight down 1 pound.  No chest pain or shortness of breath.  EKG unchanged.  Only new complaint is his left arm is a little weaker than it used to be and he has a little difficulty with his elbow etc.  That was the arm affected a little bit by his TIA as well.  I advised to go back to the neurologist but he wants to see Nii Oneill of orthopedics first.  He is thinking of joining a gym with a friend of his now that he is living in [place]. April 23, 2020.  Patient here in follow-up.  EKG sinus bradycardia at 52 with tiny Q's in 3 and aVF, T wave inversions V1 V2, and otherwise within normal limits.  No change in his baseline shortness of breath or chest pain regarding his asthma.  No palpitations or dizziness to suspect any paroxysmal arrhythmias.  He did have a period of time few weeks ago with severe muscle cramps and aches but no fever no respiratory symptoms and he went yesterday for a fingerstick antibody test in an urgent care that was negative for coronavirus. August 28, 2020.  Patient here in follow-up.  Enjoying life in [place] near his son.  No chest pain shortness of breath or any medical issues.  Saw Dr. Cohen of endocrinology last week and asked me to forward the labs to him.  Discussion about triglycerides as usual.  EKG is sinus rhythm at 58 with small Q waves in III and aVF. December 11, 2020.  Patient here in follow-up.  Going to [place] for a month this coming Sunday.  Saw Dr. Atkins on the 10th and labs showed a normal CBC, BUN 27 with creatinine 1.4 and potassium 4.5.  Normal LFTs.  Cholesterol 124 fasting triglycerides 361 HDL 29 LDL 23.  Uric acid 5.1.  Hemoglobin A1c 7.2.  Patient denies any cardiac issues or complaints.  EKG here unchanged. March 2, 2021.Patient has been having some dizziness mostly orthostatic when he gets up from sitting too long.  His internist said it could be orthostatic hypotension.  Patient called to ask about stopping his lisinopril.  He is only on 2.5 mg daily.  I told him he could certainly do that for now and then reevaluate.  April 7, 2021.  Patient here in follow-up.  EKG shows sinus rhythm at 68 with a first-degree AV block, tiny Q's inferiorly, mild ST changes V2; unchanged from last December. No dizziness since d/c lisinopril. Continues to loss weight gradually. Fully vaccinated against Covid 19. Much more relaxed since retired. July 20, 2021.  Yesterday EMS called at the patient's house because he had some weakness and slurred speech that was already mostly resolving.  I instructed them to take him to [facility].  Their code stroke was called but there was no indication for TPA.  Virtually all his symptoms had resolved.  The CT angio showed no large vessel disease and no evidence of infarction.  There was 1 area of significant territory with "delayed mean transit time suggesting territory at risk for infarction" but no idea what to do about that.  MRI was done later that night that was negative as well.  Labs were unremarkable.  Sinus rhythm throughout.  He was discharged on the 20th to follow-up with me and with neurology.  Echo was recommended as part of the protocol but he has had normal echoes in the past, not likely to have an LV thrombus, MERON thrombus would not be seen without PIERRE and the patient has been on Xarelto for out so very unlikely. July 22, 2021.  Patient returns here in follow-up.  Feels 100% back to himself although a little hesitant and anxious.  He remains in sinus rhythm with no change.  Blood pressure is excellent.  He will be scheduled for an echo and a follow-up with neurology.  He will remain on all his medications which include Plavix and Xarelto as well as statin. August 2, 2021.  The neurologist from [facility] was out of town so her friend recommended Dr. James of neurology who saw the patient and called me today.  For lack of anything else to do he suggested trying to switch from Xarelto to Eliquis 5 every 12 hours which I have no objection.  Discussed with the patient as well and will start the Eliquis tonight instead of Xarelto.  He has an appointment for an echocardiogram here August 13 and so far has not had any further episodes.  August 13, 2021.Came for echocardiogram.  Minimal MR.  Normal aortic valve.  Normal left atrium.  Normal LV function with LVEF 67%.  Normal RV function.  RVSP 30.  No color-flow Doppler evidence of ASD or PFO.  Spoke with patient who sounds back to normal.  Awaiting the reports from his neuro work-up.  August 20, 2021.  Received a note from the neurologist Dr. Crocker ([phone]) basically he is not convinced that these are TIAs and somehow thinks they could be related to cervical radiculopathy.  He is convinced that everything is being done to the maximum in terms of risk factors between the Eliquis the level of his LDL and his diabetes control etc.  He is considering reviewing the transcranial and intracranial Dopplers and considering an EEG to rule out seizures which I think is very unlikely.  He has the patient following up with orthopedics for cervical radiculopathy and he gave him a prescription for physical therapy and Occupational Therapy and advised him to take co-Q10 for the memory changes and headaches.  He will follow up with the patient in 4 to 6 weeks.  October 7, 2021.Patient called.  Had EEG in the neurologist office but is still waiting for results.  Nothing in the computer.  He will call them and asked them to send results to me as well  October 27, 2021.  Patient here in follow-up.  The EEG was negative.  So far he has had no recurrence. Feels well without complaints although does seem to have a slightly depressed affect at times. February 11, 2022.Patient called because he had a nuclear brain scan by neurology.  The scan is totally normal and it says "does not support the diagnosis of Parkinson's disease or Parkinson syndrome"  March 8, 2022.  Patient here for follow-up along with stress echocardiogram.  Definite difficulty walking and could really not go faster than stage one of the Vu protocol.  At that point his heart rate was 92 and his blood pressure was about 140/70 and he had no chest pain, no ST changes, and no echo evidence of ischemia.  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  Seems a little bradykinetic and parkinsonian affect but no complaints.  Neurology has been satisfied with how he is doing. July 12, 2022.  Patient here in follow-up.  Absolutely no more episodes since above.  Stable weight, no new symptoms, no change in medication.  No COVID episodes etc.  (Going to [place] now with his wife) November 15, 2022.  Patient returns in follow-up.  Saw neurology who is very happy with how he is doing.  He seems to have only 1 small defect in terms of writing certain types of letters but otherwise stable and they do not want to change anything in his regimen.  (They were supposed to send me a letter but I have not received it yet).  He has no new issues with chest pain or shortness of breath and no COVID issues.  His psychiatrist Dr. Colon is retiring and he wants me to continue prescribing his Lexapro 10 mg daily which he will do for now.  Getting muscle pains from the pravastatin and he finally stopped them.  Has already been on atorvastatin and simvastatin so we will try 20 mg of rosuvastatin and check a CK.  I did reassure him that he does not so clear that it is a statins that cause it and he is willing to take PCSK9 inhibitors by injection if necessary as he is already doing well on Ozempic injections. January 23, 2023. Patient called from [place].  Certainly has urinary incontinence and weakness in his wife that he cannot walk.  Certainly have to be concerned about some type of cord compression among other things.  I urged the patient to go to an urgent care center and they can call me from there.  January 26, 2023.  Patient back now here with his wife to review the situation.  It seems around Sunday the 22nd patient's legs were so weak that he could not really walk.  No other signs or symptoms to suggest stroke or TIA.  No discomfort or pain.  Slight improvement on Monday so they went to urgent care.  While he did not urgent care was told him he should go to the emergency room and they decided instead to come back home.  He has continued to gradually improve and get some strength back.  In retrospect perhaps it is tied into the fact that he was having severe pain In his statin and the rosuvastatin was stopped at least 2 weeks ago may be more.  He still remains on fenofibrate and Zetia.  His last CK was normal.  He has been losing weight because of the change in appetite.  He mostly eats cakes and only occasional regular food.  Denies change in his bowel movements etc. March 14, 2023.  Patient has been totally stable since the episode in [place].  He is now on Repatha, still on fenofibrate and Zetia, still has some muscle pain in the front of his thighs but as mentioned his CK was normal.  In the past his triglycerides have gone as high as 700 so I am a little nervous to stop the fenofibrate and he has no complaints right now.  He will be seeing neurology next week and they have said they will be doing a number of tests as well.  No cardiac symptoms or issues.  His EKG remains sinus rhythm and unchanged.  He put back to about 4 pounds.  Blood pressure is excellent and his spirits are good. June 13, 2023.  Patient returns in follow-up.  Has been fine in the interim; Abilify 5 mg added by Dr. Colon.  Meanwhile today while just putting a sheet on the bed he suddenly felt short of breath and it lasted about 15 minutes. (His wife said it is an activity that he never does).  No chest pain, and then it went away like his asthma.  In addition on Shavuot he fell coming home from [place] and Dr. Sharma his PCP happened to be there and checked him out that day and the next day and all was fine.  They now have Wendy in his house so he does not have to worry about going to and from she will.  Remains in sinus rhythm at 64 with a first-degree AV block and small Q waves inferiorly and tall R in V1. June 28, 2023. Patient called. Was at the neurologist yesterday. Complained of some dizziness and he was told to drink a lot of water and check with me about his blood pressure being low. Patient is on atenolol 12.5 mg only. No longer on lisinopril. I told the patient just to make sure he drinks a lot of water and not to be concerned for now. The neurologist is setting him up for some type of scan coming up relatively soon. He also put him on Aricept 5 mg.  July 19, 2023.  Urgent visit for patient.  He called me last week while I was on vacation in [facility] and was still having near syncopal episodes.  I stopped the atenolol and he has been asymptomatic since.  His heart rate here is 68 and he remains in sinus rhythm with a first-degree AV block and some nonspecific ST changes with small Q's inferiorly.  His blood pressure is 134/72.  His weight is up as he admits to snacking more. September 11, 2023.  Here with his wife for urgent visit.  Progressive problems with weakness in his legs and or balance with not infrequent falls and now hesitating to even go out because of that.  At one point the wife said she thinks it similar to when he was in [place]; after that we had stopped his statin and changed to Repatha injections and he did seem to get better.  Lately Dr. Cohen of endocrinology added Vascepa and he is on fenofibrate for his high triglycerides in the past as high as 700. Reviewed labs with the patient. LDL extremely low. No evidence of myositis muscle enzymes or inflammation. Patient already feeling better off the Vascepa and fenofibrate. Is awaiting a callback from Dr. Florian Sandoval of neurology for an appointment. October 10, 2023.  Patient here in follow-up.  On September 12 he saw neurology, Dr. Crocker.  He did find a mildly unsteady gait but able to tandem, no limitations and the rest of his neuro exam seems normal.  He does have a sensory peripheral polyneuropathy consistent with diabetes as well as bilateral multilevel lumbosacral radiculopathy most pronounced at L4-L5.  He felt the dizziness was probably related to cerebellar stroke or else blood pressure issues and wants the patient to maintain excellent hydration.  Continue Eliquis and Repatha along with his Plavix.  (Plavix for cardiac reasons).  Try Namenda 5 mg as the patient had headaches from Aricept and consider neurocognitive specialist although the patient wanted to hold off.  Here today he is in sinus rhythm at 63 with a first-degree AV block and the rest of the ECG is unremarkable.  Blood pressure acceptable and weight unchanged.  Patient thinks his walking is much better and he thinks it was within a few days of stopping the Vascepa fenofibrate etc..  In good spirits.  (Will start using Dr. Mt Cohen's daughter-in-law for endocrinology now that Dr. Mt Danielle has passed away.) December 20, 2023.  Patient had another set of symptoms similar to the previous ones, again admitted to the hospital, again nothing specific found was then sent to rehab and is now here after finally getting out of rehab.  Remains in sinus rhythm at 65 with a mild first-degree AV block and small inferior Q waves.  Blood pressure acceptable at 132/62.  Weight unchanged.  In the hospital he had an EEG that was unremarkable for seizure activity just some generalized slowing in the background.  This time on CTA there is a 50 to 60% stenosis prior to the bifurcation in the distal right common carotid, 50% stenosis of the bifurcation of the left common and internal carotid.  He does feel much better with physical and Occupational Therapy and his speech seems close to normal again.  His regular neurologist recommended he see Dr. Soriano of vascular neurology In January and his internist Dr. Sharma recommended. a different neurologist for February visit.  December 29, 2023.Patient called because he has been having some orthostatic type symptoms. Its mostly after sitting for a long time and then he gets up and feels a little lightheaded. It is not very frequent and he does not get near syncopal etc. Certainly has not fallen from that. I reviewed his medications and he is on nothing that affects his blood pressure anymore. I explained to him about using things such as midodrine to raise his blood pressure but I would only use that if his symptoms were more consistent or more severe as otherwise he will be left with supine hypertension which in his condition would probably not be a good thing. He was okay with that reassurance and he will keep track of how often and how severe the symptoms are. January 2, 2024. Still having some orthostatic episodes, usually after sitting and watching TV for a long time, lasts maybe even up to 5 minutes. Discussed the pros and cons of medicines to raise his blood pressure which I would like to avoid if not necessary. For now we will try to make sure he maintains hydration and will add salt to his diet. Will make sure to be very aware of the possibility when he first changes positions and to wait before walking etc. If it does continue to happen despite this we will have to bring him in for midodrine or Florinef etc. January 31, 2024 Patient called after seeing Dr. Hermilo Soriano of neurology and I reviewed Dr. Soriano's note. No indication for carotid endarterectomy at this point in time. Continue current medications including Eliquis and Plavix. Will probably repeat carotid Dopplers at the 1 year duane. Patient very happy and currently has been asymptomatic. I did mention that he should not be alarmed or disappointed if the symptoms recur as we really have not found any etiology and thankfully he bounces back every time. Keep regular upcoming cardiac care. February 12, 2024.  Patient here in follow-up.  EKG is sinus bradycardia at 59 with a first-degree AV block 0.228 and otherwise an unremarkable ECG.  Initial blood pressure a little high but upon repeat it did improve.  This is somewhat of a long stretch without any symptoms and he is very happy and optimistic and encouraged that hopefully things will be stable on this current regimen and he is very happy with Dr. Soriano. June 5, 2024. Received labs dated May 30, 2024. Normal CBC. Normal chemistries except BUN 25 with creatinine 1.1. Potassium 4.3. Hemoglobin A1c 5.7 lipase 203 amylase 78. No lipids.  June 17, 2024.  Patient here in follow-up.  After the first Seder night, the next afternoon he awoke from a nap and had no idea who he was, thought his wife was his mother and he was taken to [facility].  Workup there including CT, MRI, labs all unremarkable. and he was discharged the next day.  After about a day and a half he was back to himself.  He was diagnosed with transient global amnesia and told he does not need any follow-up.  CT angio showed no carotid occlusion, no aneurysms, no stenosis or occlusion of anterior middle or posterior cerebral arteries or distal vertebral or basilar arteries.  There was moderate stenosis of the supraclinoid ICAs right more than left.  Less than 50% stenosis at the the origin of each ICA from a calcified bifurcation plaque.  His MRI had no acute change and it did have prominent ventricles disproportionate to the sulci but that was unchanged as well.  For some reason they did a cine esophagram and swallow test and there was trace laryngeal penetration without aspiration.  Here he is totally back to himself with normal vital signs and a normal ECG.  He had labs dated May 30 as mentioned above but no lipids. September13, 2024.  I spoke with Dr. Dane Sharma.  6 days ago on Shabbos he had another TIA with definitely trouble speaking and definitely trouble moving 1 side.  Dr. Sharma was there and evaluated the patient and within 5 minutes he was back to baseline.  He did end up seeing Dr. Hermilo Soriano of neurology On September 10.  He reviewed previous studies etc. and said he still sees no role for intervening in his carotids and he wanted to continue Plavix and Eliquis.  He raised the possibility of the recurrent TIAs or atrial fibrillation related and should start to consider a Watchman device (?).  He felt he might be a good candidate for the LAAOS for study.  Meanwhile he just wanted another carotid Doppler in a year with a follow-up appointment after that December 19, 2024.  Patient returns in follow-up.  He has not had any more episodes since the 1 back in September. Dr. Phillip "not worried about TIAs". Main problem is left thigh pain which he thinks is from the back but no response so far. Somewhat disheartened but okay overall. April 10, 2025.  Patient returns in follow-up.  As of February he was seeing a new psychiatrist who wanted him on Lexapro 10 mg which he had been on before so I was okay with that.  Here today's EKG is sinus rhythm of 66 with a first-degree AV block and otherwise unremarkable.  Good spirits, happy with Dr. Phillip even though [place]".  He told the patient not to worry anymore about the TIAs.

## 2025-04-10 NOTE — ASSESSMENT
[FreeTextEntry1] : Since his cardiac catheterization with stents to his LAD and obtuse marginal (8/2013), he has done very well. Since then the majority of his symptoms are totally gone, and he is left with his usual baseline mild dyspnea on exertion and atypical symptoms. Even these now seem to be less. He was bothered by BECK again. He had a good aerobic capacity on his stress test in 2015 and no evidence of ischemia. LV function was normal on his echo in July of 2016. No significant valvular disease. He had a repeat stress echocardiogram, which seemed to have EKG changes, although the echo was totally normal. After discussion we agreed to recheck his coronary arteries (4/30/2018) and angiography showed nonobstructive disease. He has done well since then. Had symptoms of muscle pain in his leg, similar to when he was on Lipitor and Crestor, resolved when he stopped the simvastatin, but he is now on pravastatin since August, 2018 without complaints. (He had a TIA on June 11, 2016 which was temporally related to aspirin being stopped by his urologist for a procedure and the patient not restarting the aspirin. He was placed on Plavix along with the resumption of aspirin and has had no further symptoms. Neurology wanted a repeat echo, which was normal, and no clots were seen. He was placed on telemetry and had both a 7 beat run of nonsustained VT at a heart rate of 147 and a 40 beat run of atrial flutter with aberrancy and a heart rate of 100-132. Both of these were asymptomatic. He was placed on bisoprolol and Xarelto. The rest of the home telemetry did not show any further V. tach or atrial flutter. He was doing fine other than watery diarrhea, as well as some fatigue that was probably a beta blocker side effects. I believe years ago he had trouble with beta blockers as well. I stopped the bisoprolol and changed to atenolol and both the fatigue and the diarrhea resolved. He developed anterior thigh pain on the atorvastatin, and I changed him to simvastatin, with improvement, but then I recently raised the dose) He had another TIA almost identical on July 19, 2021. Resolved very quickly although the following morning on the 20th in the hospital to me his speech seemed a little off still. Work-up totally negative as mentioned above. No large vessel disease anywhere. No atrial fibrillation. He is on Xarelto and Plavix and compliant with both. Labs were unremarkable. The only abnormality was a "significant territory with delayed mean transit time suggesting territory at risk for infarction". He is on maximum protection already with statin Plavix Xarelto etc. He was scheduled for an echo as requested by neurology which was unremarkable and unchanged, and I urged him to follow-up with neurology and see if they have any suggestions as how to prevent this in the future. He did follow-up with neurology, had a negative EEG, and the neurologist is happy with how he is doing and does not want to see him again until August unless new symptoms arise. On exam he is totally unchanged with a normal exam, normal blood pressure, benign abdomen with nodular consistency unchanged, and a baseline EKG that has not changed with sinus rhythm, small Q's inferiorly, which are old, first degree AV block, T wave inversion V1 (not V2 today), and the rest is within normal limits. No APCs or VPCs on exam just some VPCs during stress and recovery 3/2022. His blood pressure is excellent. His weight is slightly less. He is now on Invokana as well as Victoza for his diabetes. Here in follow-up without any new cardiac complaints or issues. Neurologically seems stable although still concerning that we do not have definite etiologies. Having muscle pains from pravastatin so we will try 1 more statin and if not we will try PCSK9 inhibitors. Labs sent including CK. Hemoglobin A1c will be checked as well. He already had a flu shot as well as his COVID boosters. Will be going down to [place] for a while. If stable will return in 4 months. Patient here January 26, 2023.. Very confusing picture but does not sound neurologic; no findings of TIA or CVA. Perhaps muscle inflammation, pain, weakness, perhaps related to the combination of statin and fibrate although has always had normal CK levels. In addition the statin was stopped a few weeks ago. Associated weight loss also hard to explain and tied together. Currently patient looks back to baseline other than the weight loss, no leg pains, cardiac exam and EKG unchanged. Labs were sent which will include sed rate, CRP, CK, aldolase, TSH etc. We will try to change to PCSK9 inhibitors; perhaps can stop the fenofibrate as well them. Unclear as yet if he should have neurologic follow-up as well. Reviewed medications; is on Eliquis not Xarelto now. Off statin but still on Zetia and fenofibrate. Off Jardiance but on Farxiga and Ozempic Returned March 14, 2023 on Repatha but still gets some anterior thigh pain. Still on fenofibrate and Zetia. From a cardiac point of view totally stable and if anything his weight is back up 4 pounds. He will be seeing neurology next week. EKG is totally unchanged in sinus rhythm. Returned again June 13, 2023. Has been very stable other than the one fall on the holiday coming from [place] and the episode of shortness of breath while putting a sheet on the bed with his wife this morning. Exam and ECG today are unchanged. Labs will be sent. Now on Abilify as well. Tolerating Repatha without a problem. Pretty good spirits. Weight up a few more pounds which may be a good sign. Urgent visit July 19 after continuing dizzy and near syncopal episodes which now have seem to resolve off atenolol totally. Still remains in sinus rhythm and still has a first-degree AV block. No other symptoms at this time. I reviewed his long medication list. In addition to being off atenolol he is on Vascepa which was given to him by Dr. Cohen (I am not in favor of Vascepa usually). No other medications that have any significant effect on blood pressure or heart rate. Exam is unchanged and unremarkable. Another urgent visit September 11, 2023. Having trouble with his legs, trouble with his walking and his balance with a few falls and seemingly weakness. To them it seems like when he was in [place] and after that we had stopped his statin. His endocrinologist had recently put him on Vascepa and I am concerned that that may be including the fenofibrate could be the issue. Now that he is on Repatha injections he may not need even the fenofibrate for his triglycerides so for now I told him to stop both the Cipro and fenofibrate while I check labs. He had no evidence of myasthenia gravis on exam. I would like him to see neurology again especially if there is no change off of those 2 medications. He does seem perfectly stable from a blood pressure and cardiac point of view with no syncope or near syncope since we stopped his atenolol. Patient returned October 10, 2023 and seems as close to normal as he has been in a while. 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 Cardiovascular status is stable, blood pressure good, EKG unchanged and exam unremarkable. We will continue to current medications and if he remains stable he can follow-up for routine visit plus labs in 3 months. Patient returned again December 20, 2023. He had another episode of neurologic symptoms with unclear etiology. The only difference this time seems to be a 50 to 60% stenosis in his common carotid arteries but surgical consult at the time felt this is still a nonsurgical case. He will be following up with vascular neurology. I reviewed the rest of his hospital studies and his medications. He seems to be back to himself at least 90%. Labs in the hospital were unremarkable. His LDL cannot go much lower, his blood pressure is excellent his diabetes control is excellent, he is on Eliquis and Plavix so it is not clear what else there is to offer and whether or not there are any other etiologies. As he is seeing Dr. Soriano in January and a different neurologist in February I would follow-up with him in March unless there are any more events. Patient returned February 12, 2024. Very happy with Dr. Soriano who felt also that there was no indication for revascularization or any intervention and just wants to follow him clinically now. Dr. Soriano wants to continue the Plavix and the Eliquis and may be repeat a carotid Doppler in a year and only see him again in 1 year. We reviewed the patient's medications including the fark [employer]. He is still on fenofibrate along with Ozempic, Repatha, ezetimibe. Metformin. He seems to be at baseline from a cardiac point of view as well with good blood pressure control, no real cardiac symptoms, no change in his ECG with sinus bradycardia at 59 and a first-degree AV block. Labs were sent. Patient returned June 17, 2024. New issue was a hospitalization for about 2-1/2 days at the end of April during [place] for transient global amnesia. Workup as mentioned above. No change in his medications and he is clearly back to himself. Exam vital signs ECG all baseline. Reviewed all of his records send long discussion with patient. Patient returned for an urgent visit, September 13, 2024 because of another TIA episode. This time the drooping face was on the right instead of the left and the patient was initially not aware until he tried to speak. It had been 5 months since his last episode. It only lasted about 5 minutes and there was a question of whether he would seem to come back before he went into the ambulance but by the time he got to the ambulance he was back to normal and was only in the hospital briefly. We reviewed all his medications. He saw Dr. Hermilo Soriano of neurology who reviewed everything and as mentioned he is on correct and maximum everything without much to add other than increasing his bleeding risk. Perhaps he occasionally misses some medication but he is not sure. Meanwhile we will check his labs and make sure that everything is where it should be and just forge ahead.Not sure what Dr. Soriano meant about the Watchman device or how that would change anything. So far there has been no evidence that atrial arrhythmias have been playing a role with these episodes. Agree there is no indication for carotid intervention at this time. Patient returned in follow-up today December 19, 2024. Thankfully no more TIA episodes since above, very stable from a cardiovascular point of view, excellent blood pressure normal EKG etc. Unfortunately having a lot of pain in his left thigh which Dr. Phillip thinks is from his spine but has not been able to help him so far including spinal injections and Neurontin. I suggested maybe he actually see an orthopedist. Otherwise I am happy with how he is doing and we tried to emphasize the positive. Labs were sent.      Patient did see Dr. Hermilo Soriano on January 7 who agreed that there was no point to intervening surgically in his carotids.  The carotid stenosis on CTA was moderate bilaterally but not severe.  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  He recommended follow-up carotid Dopplers in 9 months.  Meanwhile the patient is very happy with Dr. Phillip as mentioned above.  Stable from a cardiac point of view as well with excellent blood pressure and acceptable exam and ECG.  Labs were sent including lipids and hemoglobin A1c and proBNP.  If all okay we will just have him follow-up again in 4 months.  Echo only if there has been a change in the proBNP.  No plans for stress testing at this point.